# Patient Record
Sex: FEMALE | Race: OTHER | Employment: UNEMPLOYED | URBAN - METROPOLITAN AREA
[De-identification: names, ages, dates, MRNs, and addresses within clinical notes are randomized per-mention and may not be internally consistent; named-entity substitution may affect disease eponyms.]

---

## 2022-08-11 ENCOUNTER — HOSPITAL ENCOUNTER (EMERGENCY)
Facility: HOSPITAL | Age: 32
Discharge: HOME | End: 2022-08-12
Attending: EMERGENCY MEDICINE
Payer: MEDICAID

## 2022-08-11 DIAGNOSIS — O20.0 THREATENED MISCARRIAGE: Primary | ICD-10-CM

## 2022-08-11 DIAGNOSIS — O46.8X1 SUBCHORIONIC HEMORRHAGE OF PLACENTA IN FIRST TRIMESTER, SINGLE OR UNSPECIFIED FETUS: ICD-10-CM

## 2022-08-11 DIAGNOSIS — O41.8X10 SUBCHORIONIC HEMORRHAGE OF PLACENTA IN FIRST TRIMESTER, SINGLE OR UNSPECIFIED FETUS: ICD-10-CM

## 2022-08-11 PROCEDURE — 99284 EMERGENCY DEPT VISIT MOD MDM: CPT

## 2022-08-11 PROCEDURE — 36415 COLL VENOUS BLD VENIPUNCTURE: CPT

## 2022-08-12 ENCOUNTER — APPOINTMENT (OUTPATIENT)
Dept: ULTRASOUND IMAGING | Facility: HOSPITAL | Age: 32
End: 2022-08-12
Attending: EMERGENCY MEDICINE
Payer: MEDICAID

## 2022-08-12 VITALS
DIASTOLIC BLOOD PRESSURE: 72 MMHG | SYSTOLIC BLOOD PRESSURE: 108 MMHG | WEIGHT: 149 LBS | TEMPERATURE: 98 F | RESPIRATION RATE: 17 BRPM | OXYGEN SATURATION: 99 % | HEART RATE: 87 BPM

## 2022-08-12 LAB
B-HCG SERPL-ACNC: ABNORMAL MIU/ML
B-HCG UR QL: POSITIVE
RH BLOOD TYPE: POSITIVE

## 2022-08-12 PROCEDURE — 84702 CHORIONIC GONADOTROPIN TEST: CPT | Performed by: EMERGENCY MEDICINE

## 2022-08-12 PROCEDURE — 81025 URINE PREGNANCY TEST: CPT

## 2022-08-12 PROCEDURE — 86901 BLOOD TYPING SEROLOGIC RH(D): CPT | Performed by: EMERGENCY MEDICINE

## 2022-08-12 PROCEDURE — 76801 OB US < 14 WKS SINGLE FETUS: CPT | Performed by: EMERGENCY MEDICINE

## 2022-08-12 PROCEDURE — 86900 BLOOD TYPING SEROLOGIC ABO: CPT | Performed by: EMERGENCY MEDICINE

## 2022-08-12 PROCEDURE — 76817 TRANSVAGINAL US OBSTETRIC: CPT | Performed by: EMERGENCY MEDICINE

## 2022-08-12 NOTE — ED INITIAL ASSESSMENT (HPI)
Patient arrives to the ER complaining of abdominal pain x2 days. 10 weeks pregnant. +bleeding. No vomiting. No fever.

## 2022-08-12 NOTE — ED QUICK NOTES
Pt given water to drink.  Call light in reach, instructed pt to notify RN when \"ready to void\" Verbalized understanding with

## 2024-02-29 ENCOUNTER — HOSPITAL ENCOUNTER (EMERGENCY)
Facility: HOSPITAL | Age: 34
Discharge: HOME | End: 2024-02-29
Attending: EMERGENCY MEDICINE
Payer: MEDICAID

## 2024-02-29 VITALS
HEIGHT: 60.63 IN | BODY MASS INDEX: 31.37 KG/M2 | HEART RATE: 65 BPM | RESPIRATION RATE: 17 BRPM | DIASTOLIC BLOOD PRESSURE: 74 MMHG | SYSTOLIC BLOOD PRESSURE: 107 MMHG | OXYGEN SATURATION: 99 % | TEMPERATURE: 99 F | WEIGHT: 164 LBS

## 2024-02-29 DIAGNOSIS — S61.210A LACERATION OF RIGHT INDEX FINGER WITHOUT FOREIGN BODY WITHOUT DAMAGE TO NAIL, INITIAL ENCOUNTER: Primary | ICD-10-CM

## 2024-02-29 PROCEDURE — 99283 EMERGENCY DEPT VISIT LOW MDM: CPT

## 2024-02-29 PROCEDURE — 12001 RPR S/N/AX/GEN/TRNK 2.5CM/<: CPT

## 2024-02-29 PROCEDURE — 0HQFXZZ REPAIR RIGHT HAND SKIN, EXTERNAL APPROACH: ICD-10-PCS | Performed by: EMERGENCY MEDICINE

## 2024-02-29 PROCEDURE — 99282 EMERGENCY DEPT VISIT SF MDM: CPT

## 2024-02-29 RX ORDER — MULTIVIT,IRON,MINERALS/LUTEIN
1 TABLET ORAL DAILY
COMMUNITY

## 2024-02-29 RX ORDER — LIDOCAINE HCL/EPINEPHRINE/PF 2%-1:200K
20 VIAL (ML) INJECTION ONCE
Status: COMPLETED | OUTPATIENT
Start: 2024-02-29 | End: 2024-02-29

## 2024-03-01 NOTE — ED PROVIDER NOTES
Patient Seen in: [facility] Emergency Department    History     Chief Complaint   Patient presents with    Laceration/Abrasion       HPI    33-year-old female with no significant past medical history approximately 6 months pregnant presents to the ED for laceration to her right hand second digit.  Patient states she was washing the dishes when a glass broke and sliced her index finger on the dorsum.  No numbness or tingling.  No difficulty in movement.    History from Independent Source:       External Records Reviewed:     History reviewed.   Past Medical History:   Diagnosis Date    Preeclampsia (HCC)        History reviewed.   Past Surgical History:   Procedure Laterality Date               Medications :  (Not in a hospital admission)       No family history on file.    Smoking Status:   Social History     Socioeconomic History    Marital status:    Tobacco Use    Smoking status: Never    Smokeless tobacco: Never   Vaping Use    Vaping Use: Never used   Substance and Sexual Activity    Alcohol use: Never    Drug use: Never       Constitutional and vital signs reviewed.      Social History and Family History elements reviewed from today, pertinent positives to the presenting problem noted.    Physical Exam     ED Triage Vitals   BP 24 126/71   Pulse 24 78   Resp 24 18   Temp 24 98.5 °F (36.9 °C)   Temp src 24 Temporal   SpO2 24 98 %   O2 Device --      Physical exam:  Right hand with full range of motion.  Normal distal capillary refill and sensation.  There is a 2 cm laceration on the extensor surface at the proximal phalanx without foreign body.  Mild venous oozing of blood.      All measures to prevent infection transmission during my interaction with the patient were taken. The patient was already wearing a droplet mask on my arrival to the room. Personal protective equipment was worn throughout the duration of the exam.       ED Course      Labs Reviewed - No data to display  My Independent Interpretation of EKG (if performed):       Imaging Results Available and Reviewed while in ED: No results found.  ED Medications Administered:   Medications   lidocaine 2%-EPINEPHrine 1:200,000 (Xylocaine-Epinephrine) injection (20 mL Infiltration Given 2/29/24 2036)             MDM     Vitals:    02/29/24 1959 02/29/24 2000   BP: 126/71    Pulse: 78    Resp: 18    Temp:  98.5 °F (36.9 °C)   TempSrc:  Temporal   SpO2: 98%    Weight:  74.4 kg   Height:  154 cm (5' 0.63\")     *I personally reviewed and interpreted all ED vitals.    Independent Interpretation of Studies:     Social Determinants of Health:     Procedures: Laceration Procedure Note    Laceration repair of right hand second digit  Time out.  Consented.  Wound irrigated with 500 mL NS  Sterile precautions and prep.  1% lidocaine with epi, 3 mL for anesthesia.  2 cm laceration which is linear and without foreign body in appearance.  Laceration was simple.  Wound repaired with simple interrupted sutures with 4-0 Prolene.  Sterile dressing.  Patient tolerated well.    Differential/MDM/Shared Decision Making: Differential Diagnosis includes laceration, foreign body, fracture, others.      The patient already has pregnancy state to contribute to the complexity of this ED evaluation.           Patient states her immunizations are up-to-date.  Laceration has been repaired.  Discussed case with patient and she is comfortable discharge.      Condition upon leaving the department: Stable    Disposition and Plan     Clinical Impression:  1. Laceration of right index finger without foreign body without damage to nail, initial encounter        Disposition:  Discharge    Follow-up:  Lexie Guajardo MD  [address] [place] [address] [id number] [phone]    Schedule an appointment as soon as possible for a visit in 1 week(s)  For suture removal      Medications Prescribed:  Current Discharge Medication  List

## 2024-03-01 NOTE — ED INITIAL ASSESSMENT (HPI)
Pt presents with a laceration to her right hand second digit from a broken glass.  Last tetanus unknown.  Pt is pregnant with SANDRA 6/4/2024.  Bleeding controlled at this time.

## 2024-03-08 ENCOUNTER — HOSPITAL ENCOUNTER (EMERGENCY)
Facility: HOSPITAL | Age: 34
Discharge: HOME | End: 2024-03-08
Attending: EMERGENCY MEDICINE
Payer: MEDICAID

## 2024-03-08 VITALS
BODY MASS INDEX: 30.98 KG/M2 | DIASTOLIC BLOOD PRESSURE: 58 MMHG | RESPIRATION RATE: 16 BRPM | SYSTOLIC BLOOD PRESSURE: 100 MMHG | HEART RATE: 72 BPM | TEMPERATURE: 98 F | WEIGHT: 162 LBS | OXYGEN SATURATION: 100 % | HEIGHT: 60.63 IN

## 2024-03-08 DIAGNOSIS — S61.210D: ICD-10-CM

## 2024-03-08 DIAGNOSIS — Z48.02 ENCOUNTER FOR REMOVAL OF SUTURES: Primary | ICD-10-CM

## 2024-03-08 NOTE — ED PROVIDER NOTES
Patient Seen in: [facility] Emergency Department      History     Chief Complaint   Patient presents with    Sut Stap RingRemoval     Stated Complaint: Needs Stiches Removed    Subjective:   HPI    Patient sustained a laceration to right index finger 8 days ago here for suture removal.  She denies any pain numbness weakness drainage redness    Objective:   Past Medical History:   Diagnosis Date    Preeclampsia (HCC)               Past Surgical History:   Procedure Laterality Date                      Social History     Socioeconomic History    Marital status:    Tobacco Use    Smoking status: Never    Smokeless tobacco: Never   Vaping Use    Vaping Use: Never used   Substance and Sexual Activity    Alcohol use: Never    Drug use: Never              Review of Systems    Positive for stated complaint: Needs Stiches Removed  Other systems are as noted in HPI.  Constitutional and vital signs reviewed.      All other systems reviewed and negative except as noted above.    Physical Exam     ED Triage Vitals [24 0900]   /58   Pulse 72   Resp 16   Temp 98.2 °F (36.8 °C)   Temp src Temporal   SpO2 100 %   O2 Device None (Room air)       Current:/58   Pulse 72   Temp 98.2 °F (36.8 °C) (Temporal)   Resp 16   Ht 154 cm (5' 0.63\")   Wt 73.5 kg   SpO2 100%   BMI 30.98 kg/m²         Physical Exam  Vitals and nursing note reviewed.   Constitutional:       Appearance: Normal appearance.   HENT:      Head: Normocephalic.      Mouth/Throat:      Mouth: Mucous membranes are moist.   Musculoskeletal:      Right hand: Laceration (Healing well) present. No tenderness. Normal strength. There is no disruption of two-point discrimination. Normal capillary refill.        Hands:    Skin:     Findings: No erythema.   Neurological:      Mental Status: She is alert and oriented to person, place, and time.      Sensory: No sensory deficit.      Motor: No weakness.               ED Course   Labs  Reviewed - No data to display     Procedure/suture removal   Sutures removed by myself with no evidence of infection           MDM                                         Medical Decision Making  Right index finger laceration here for suture removal  Wound well-approximated no evidence of infection  Neurovascular intact    Problems Addressed:  Encounter for removal of sutures: acute illness or injury  Laceration of right index finger, subsequent encounter: self-limited or minor problem        Disposition and Plan     Clinical Impression:  1. Encounter for removal of sutures    2. Laceration of right index finger, subsequent encounter         Disposition:  Discharge  3/8/2024  9:39 am    Follow-up:  No follow-up provider specified.        Medications Prescribed:  Current Discharge Medication List

## 2024-03-08 NOTE — ED INITIAL ASSESSMENT (HPI)
Patient here for removal of stitches to right 2nd digit. Denies complications Sutures placed 8 days ago.    Translation used: Ulises #[id number]